# Patient Record
(demographics unavailable — no encounter records)

---

## 2025-05-28 NOTE — HISTORY OF PRESENT ILLNESS
[Never] : never [TextBox_4] : 63 year old presenting for follow up with known hx of ASAEL. Patient states that he has been using the machine well without issues. States that he does need new supplies but otherwise still notices a difference on the machine. [ESS] : 0

## 2025-05-28 NOTE — ASSESSMENT
[FreeTextEntry1] : 63-year-old presenting for follow up with known ASAEL on PAP therapy.   Data reviewed: Sleep study 03/2023- AHI 10-17- mild to moderate ASAEL Compliance report 02/26/25-05/26/25- 90/90 days used; Average hours 6 and 33 minutes; AHI 0.9   ASAEL Snoring Apnea  Patient with known hx of ASAEL and advised about medical risk factors associated with moderate ASAEL needing treatment including HTN, CAD, and stroke. Patient at goal compliance with AHI normalized and goal hours. Will order supplies as needed.  - Patient at goal compliance - Advised about medical risk factors